# Patient Record
Sex: FEMALE | Race: WHITE | Employment: PART TIME | URBAN - METROPOLITAN AREA
[De-identification: names, ages, dates, MRNs, and addresses within clinical notes are randomized per-mention and may not be internally consistent; named-entity substitution may affect disease eponyms.]

---

## 2017-02-22 ENCOUNTER — HOSPITAL ENCOUNTER (OUTPATIENT)
Dept: MAMMOGRAPHY | Age: 62
Discharge: HOME | End: 2017-02-22
Attending: OBSTETRICS & GYNECOLOGY | Admitting: OBSTETRICS & GYNECOLOGY

## 2017-02-22 DIAGNOSIS — Z12.31 ENCOUNTER FOR SCREENING MAMMOGRAM FOR MALIGNANT NEOPLASM OF BREAST: ICD-10-CM

## 2017-03-06 ENCOUNTER — HOSPITAL ENCOUNTER (OUTPATIENT)
Dept: MAMMOGRAPHY | Age: 62
Discharge: HOME | End: 2017-03-06
Admitting: INTERNAL MEDICINE

## 2017-03-06 DIAGNOSIS — R92.8 ABNORMAL MAMMOGRAM, UNSPECIFIED: ICD-10-CM

## 2017-07-31 ENCOUNTER — OFFICE VISIT (OUTPATIENT)
Dept: ORTHOPEDIC SURGERY | Age: 62
End: 2017-07-31

## 2017-07-31 VITALS — WEIGHT: 140 LBS | HEIGHT: 66 IN | BODY MASS INDEX: 22.5 KG/M2

## 2017-07-31 DIAGNOSIS — S70.02XA CONTUSION OF LEFT HIP, INITIAL ENCOUNTER: ICD-10-CM

## 2017-07-31 DIAGNOSIS — M25.562 LEFT KNEE PAIN, UNSPECIFIED CHRONICITY: Primary | ICD-10-CM

## 2017-07-31 DIAGNOSIS — S83.242A TEAR OF MEDIAL MENISCUS OF LEFT KNEE, CURRENT, UNSPECIFIED TEAR TYPE, INITIAL ENCOUNTER: ICD-10-CM

## 2017-07-31 PROCEDURE — 99203 OFFICE O/P NEW LOW 30 MIN: CPT | Performed by: ORTHOPAEDIC SURGERY

## 2017-07-31 PROCEDURE — 73564 X-RAY EXAM KNEE 4 OR MORE: CPT | Performed by: ORTHOPAEDIC SURGERY

## 2017-07-31 RX ORDER — ATORVASTATIN CALCIUM 10 MG/1
10 TABLET, FILM COATED ORAL DAILY
COMMUNITY

## 2017-07-31 RX ORDER — MELOXICAM 15 MG/1
15 TABLET ORAL DAILY
Qty: 90 TABLET | Refills: 3 | Status: SHIPPED | OUTPATIENT
Start: 2017-07-31 | End: 2018-11-26 | Stop reason: SDUPTHER

## 2017-08-07 ENCOUNTER — OFFICE VISIT (OUTPATIENT)
Dept: ORTHOPEDIC SURGERY | Age: 62
End: 2017-08-07

## 2017-08-07 VITALS — BODY MASS INDEX: 22.5 KG/M2 | HEIGHT: 66 IN | WEIGHT: 139.99 LBS

## 2017-08-07 DIAGNOSIS — M70.62 TROCHANTERIC BURSITIS, LEFT HIP: ICD-10-CM

## 2017-08-07 DIAGNOSIS — S83.242D TEAR OF MEDIAL MENISCUS OF LEFT KNEE, CURRENT, UNSPECIFIED TEAR TYPE, SUBSEQUENT ENCOUNTER: Chronic | ICD-10-CM

## 2017-08-07 DIAGNOSIS — S70.02XD CONTUSION OF LEFT HIP, SUBSEQUENT ENCOUNTER: Primary | Chronic | ICD-10-CM

## 2017-08-07 PROCEDURE — L1812 KO ELASTIC W/JOINTS PRE OTS: HCPCS | Performed by: ORTHOPAEDIC SURGERY

## 2017-08-07 PROCEDURE — 20610 DRAIN/INJ JOINT/BURSA W/O US: CPT | Performed by: ORTHOPAEDIC SURGERY

## 2017-08-07 PROCEDURE — 99999 PR OFFICE/OUTPT VISIT,PROCEDURE ONLY: CPT | Performed by: ORTHOPAEDIC SURGERY

## 2017-08-08 ENCOUNTER — HOSPITAL ENCOUNTER (OUTPATIENT)
Dept: PHYSICAL THERAPY | Age: 62
Discharge: HOME | End: 2017-07-31
Admitting: ORTHOPAEDIC SURGERY

## 2017-09-11 ENCOUNTER — OFFICE VISIT (OUTPATIENT)
Dept: ORTHOPEDIC SURGERY | Age: 62
End: 2017-09-11

## 2017-09-11 VITALS — BODY MASS INDEX: 22.5 KG/M2 | HEIGHT: 66 IN | WEIGHT: 139.99 LBS

## 2017-09-11 DIAGNOSIS — S83.242D TEAR OF MEDIAL MENISCUS OF LEFT KNEE, CURRENT, UNSPECIFIED TEAR TYPE, SUBSEQUENT ENCOUNTER: Chronic | ICD-10-CM

## 2017-09-11 DIAGNOSIS — M70.62 TROCHANTERIC BURSITIS, LEFT HIP: Primary | Chronic | ICD-10-CM

## 2017-09-11 PROCEDURE — 99212 OFFICE O/P EST SF 10 MIN: CPT | Performed by: ORTHOPAEDIC SURGERY

## 2017-09-11 PROCEDURE — 73502 X-RAY EXAM HIP UNI 2-3 VIEWS: CPT | Performed by: ORTHOPAEDIC SURGERY

## 2017-09-20 ENCOUNTER — HOSPITAL ENCOUNTER (OUTPATIENT)
Dept: PHYSICAL THERAPY | Age: 62
Discharge: HOME | End: 2017-09-30
Admitting: ORTHOPAEDIC SURGERY

## 2017-09-20 NOTE — FLOWSHEET NOTE
[facility],  [address] [place]  Phone: [phone]  Fax [phone]    Physical Therapy Daily Treatment Note  Date:  2017    Patient Name:  Naveed Raymond    :  1955  MRN: [id number]  Restrictions/Precautions:    Physician Information:  Referring Practitioner: Dr. Henry Caballero  Medical/Treatment Diagnosis Information:  Diagnosis: Left hip trochanteric bursitis   M70.62l  Left hip pain  M25.552  Left knee pain   M25.562  · Treatment Diagnosis:  Left hip pain M25.552  [] Conservative / [] Surgical - DOS:  Therapy Diagnosis/Practice Pattern:  Practice Pattern E: Localized Inflammation  Insurance/Certification information:  PT Insurance Information: 17 ANTHEM - $0CP - $2500DED(MET) - 60PT/OT- 100% - NO AUTH  Plan of care signed: [] YES  [] NO  Number of Comorbidities:  [x]0     []1-2    []3+  Date of Patient follow up with Physician:     G-Code (if applicable):      Date G-Code Applied:  17  PT G-Codes  Functional Assessment Tool Used: LEFS  Score: 16%  Functional Limitation: Mobility: Walking and moving around  Mobility: Walking and Moving Around Current Status ():  At least 1 percent but less than 20 percent impaired, limited or restricted  Mobility: Walking and Moving Around Goal Status (): 0 percent impaired, limited or restricted    Progress Note: [x]  Yes  []  No  Next due by: Visit #10        Latex Allergy:  [x]NO      []YES  Preferred Language for Healthcare:   [x]English       []other:    Visit # Insurance Allowable Reporting Period    Begin Date: 2017               End Date:      RECERT DUE BY:     Pain level:  2/10     SUBJECTIVE:  See eval    OBJECTIVE: See eval  Observation:  Palpation:     Test used Initial score Current Score   Pain Summary VAS 2    Functional questionnaire LEFS 16    ROM flexion      extension     Strength quad      ABD      flexion motor skill, proprioception of core, proximal hip and LE for self care, mobility, lifting, and ambulation/stair navigation      Manual Treatments:  PROM / STM / Oscillations-Mobs:  G-I, II, III, IV (PA's, Inf., Post.)  [] (70860) Provided manual therapy to mobilize LE, proximal hip and/or LS spine soft tissue/joints for the purpose of modulating pain, promoting relaxation,  increasing ROM, reducing/eliminating soft tissue swelling/inflammation/restriction, improving soft tissue extensibility and allowing for proper ROM for normal function with self care, mobility, lifting and ambulation. Modalities:      Charges:  Timed Code Treatment Minutes: 30   Total Treatment Minutes: 60     [x] EVAL (LOW) 99410 (typically 20 minutes face-to-face)  [] EVAL (MOD) 18141 (typically 30 minutes face-to-face)  [] EVAL (HIGH) 54436 (typically 45 minutes face-to-face)  [] RE-EVAL     [x] ON(51213) x  2   [] IONTO  [] NMR (46767) x      [] VASO  [] Manual (97465) x       [] Other:  [] TA x       [] Mech Traction (27728)  [] ES(attended) (33296)      [] ES (un) (65407):     GOALS:   Patient stated goal: exercise without pain. Therapist goals for Patient:   Short Term Goals: To be achieved in: 2 weeks  1. Independent in HEP and progression per patient tolerance, in order to prevent re-injury. 2. Patient will have a decrease in pain to facilitate improvement in movement, function, and ADLs as indicated by Functional Deficits. Long Term Goals: To be achieved in: 6 weeks  1. Disability index score of 0% or less for the LEFS to assist with reaching prior level of function. 2. Patient will demonstrate increased AROM of left hip and knee to equal that of right to allow for proper joint functioning as indicated by patients Functional Deficits.    3. Patient will demonstrate an increase in Strength to good proximal hip strength and control, within 5lb HHD in LE to allow for proper functional mobility as indicated by patients
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
## 2017-09-29 ENCOUNTER — HOSPITAL ENCOUNTER (OUTPATIENT)
Dept: PHYSICAL THERAPY | Age: 62
Discharge: HOME | End: 2017-09-29
Admitting: ORTHOPAEDIC SURGERY

## 2017-09-29 NOTE — FLOWSHEET NOTE
[facility], [address]  Phone: [phone]  Fax [phone]    Physical Therapy Daily Treatment Note  Date:  2017    Patient Name:  Arabella Gillette    :  1955  MRN: [id number]  Restrictions/Precautions:    Physician Information:  Referring Practitioner: Dr. Erik Clark  Medical/Treatment Diagnosis Information:  Diagnosis: Left hip trochanteric bursitis   M70.62l  Left hip pain  M25.552  Left knee pain   M25.562  · Treatment Diagnosis:  Left hip pain M25.552  [] Conservative / [] Surgical - DOS:  Therapy Diagnosis/Practice Pattern:  Practice Pattern E: Localized Inflammation  Insurance/Certification information:  PT Insurance Information: 17 ANTHEM - $0CP - $2500DED(MET) - 60PT/OT- 100% - NO AUTH  Plan of care signed: [] YES  [] NO  Number of Comorbidities:  [x]0     []1-2    []3+  Date of Patient follow up with Physician:     G-Code (if applicable):      Date G-Code Applied:  17  PT G-Codes  Functional Assessment Tool Used: LEFS  Score: 16%  Functional Limitation: Mobility: Walking and moving around  Mobility: Walking and Moving Around Current Status (): At least 1 percent but less than 20 percent impaired, limited or restricted  Mobility: Walking and Moving Around Goal Status (): 0 percent impaired, limited or restricted    Progress Note: [x]  Yes  []  No  Next due by: Visit #10        Latex Allergy:  [x]NO      []YES  Preferred Language for Healthcare:   [x]English       []other:    Visit # Insurance Allowable Reporting Period   2 60 Begin Date: 2017               End Date:      RECERT DUE BY:     Pain level:  2/10     SUBJECTIVE: Pt is not getting sharp pains in the hip. Ice is really helping.    OBJECTIVE:   Observation:  Palpation:     Test used Initial score Current Score   Pain Summary VAS 2    Functional questionnaire LEFS 16    ROM flexion      extension Strength quad      ABD      flexion          RESTRICTIONS/PRECAUTIONS:     Exercises/Interventions:     Therapeutic Ex Sets/reps Notes        Prone TKE X 10  :05 hep   Clamshells  orange X 20 bilat hep   Bridges  SB   3D  X 10 each hep   Supine hip abd/ add with legs ext X 10 hep   SAQ X 10 hep   Supine march x15 hep   Piriformis  3x :20 hep   Seated HS s 3x  :20 hep   Seated towel s 3x  ;20 hep   Incline s 4x  :20    TB lateral side step Orange 2 laps. Sleep position,  ice reviewed     SLR with ER 2 x 10     Leg Press bilat with add  80#  2 x 10     Leg Press ECC 70#    [facility]  Hip abd 45#  2 x 10    Manual Intervention     Patella mobs 3 min    PROM knee 3 min. NMR re-education                                                      Therapeutic Exercise and NMR EXR  [x] (24132) Provided verbal/tactile cueing for activities related to strengthening, flexibility, endurance, ROM for improvements in LE, proximal hip, and core control with self care, mobility, lifting, ambulation.  [] (42947) Provided verbal/tactile cueing for activities related to improving balance, coordination, kinesthetic sense, posture, motor skill, proprioception  to assist with LE, proximal hip, and core control in self care, mobility, lifting, ambulation and eccentric single leg control.      NMR and Therapeutic Activities:    [x] (52169 or 69358) Provided verbal/tactile cueing for activities related to improving balance, coordination, kinesthetic sense, posture, motor skill, proprioception and motor activation to allow for proper function of core, proximal hip and LE with self care and ADLs  [] (54953) Gait Re-education- Provided training and instruction to the patient for proper LE, core and proximal hip recruitment and positioning and eccentric body weight control with ambulation re-education including up and down stairs     Home Exercise Program:    [x] (07381) Reviewed/Progressed HEP activities related to right to allow for proper joint functioning as indicated by patients Functional Deficits. 3. Patient will demonstrate an increase in Strength to good proximal hip strength and control, within 5lb HHD in LE to allow for proper functional mobility as indicated by patients Functional Deficits. 4. Patient will return to sleeping 7- 8 hours at night on either side without increased symptoms or restriction. 5. Able to ascend and descend stairs and incline/declines without hip pain. New or Updated Goals (if applicable):  [x] No change to goals established upon initial eval/last progress note:  New Goals:    Progression Towards Functional goals:   [] Patient is progressing as expected towards functional goals listed. [] Progression is slowed due to complexities listed. [] Progression has been slowed due to co-morbidities.   [x] Plan just implemented, too soon to assess goals progression  [] Other:     ASSESSMENT:    [] Improvement noted relative to goals:  [] No Improvement noted related to goals:  Summary/Patient's response to treatment: See Eval    Treatment/Activity Tolerance:  [x] Patient tolerated treatment well [] Patient limited by fatique  [] Patient limited by pain  [] Patient limited by other medical complications  [] Other:     Prognosis: [] Good [] Fair  [] Poor    Patient Requires Follow-up: [x] Yes  [] No    PLAN: See eval  [x] Continue per plan of care [] Alter current plan (see comments)  [] Plan of care initiated [] Hold pending MD visit [] Discharge    Electronically signed by: Daniela Vigil PT

## 2017-10-04 ENCOUNTER — HOSPITAL ENCOUNTER (OUTPATIENT)
Dept: PHYSICAL THERAPY | Age: 62
Discharge: HOME | End: 2017-10-04
Admitting: ORTHOPAEDIC SURGERY

## 2017-10-04 NOTE — FLOWSHEET NOTE
[facility], [address] [place] [address]  Phone: [phone]  Fax [phone]    Physical Therapy Daily Treatment Note  Date:  10/4/2017    Patient Name:  Dru Epley    :  1955  MRN: [id number]  Restrictions/Precautions:    Physician Information:  Referring Practitioner: Dr. Steve Rausch  Medical/Treatment Diagnosis Information:  Diagnosis: Left hip trochanteric bursitis   M70.62l  Left hip pain  M25.552  Left knee pain   M25.562  · Treatment Diagnosis:  Left hip pain M25.552  [] Conservative / [] Surgical - DOS:  Therapy Diagnosis/Practice Pattern:  Practice Pattern E: Localized Inflammation  Insurance/Certification information:  PT Insurance Information: 17 ANTHEM - $0CP - $2500DED(MET) - 60PT/OT- 100% - NO AUTH  Plan of care signed: [] YES  [] NO  Number of Comorbidities:  [x]0     []1-2    []3+  Date of Patient follow up with Physician:     G-Code (if applicable):      Date G-Code Applied:  17  PT G-Codes  Functional Assessment Tool Used: LEFS  Score: 16%  Functional Limitation: Mobility: Walking and moving around  Mobility: Walking and Moving Around Current Status (): At least 1 percent but less than 20 percent impaired, limited or restricted  Mobility: Walking and Moving Around Goal Status (): 0 percent impaired, limited or restricted    Progress Note: [x]  Yes  []  No  Next due by: Visit #10        Latex Allergy:  [x]NO      []YES  Preferred Language for Healthcare:   [x]English       []other:    Visit # Insurance Allowable Reporting Period   3 60 Begin Date: 10/4/2017               End Date:      RECERT DUE BY:     Pain level:  2/10     SUBJECTIVE: Pt has discomfort when palpating medial knee. Pt had tenderness over fibular head. Pt went for a 40 minute walk. Pt is not taking any meds currently.     OBJECTIVE:   Observation:  Palpation:     Test used Initial score Current Score

## 2017-10-04 NOTE — FLOWSHEET NOTE
[facility] [address] [facility] [place]  Phone: [phone]  Fax [phone]      ATHLETIC TRAINING [address]  Date:  10/4/2017    Patient Name:  Sandeep Pulliam    :  1955  MRN: [id number]  Restrictions/Precautions:    Medical/Treatment Diagnosis Information:  · Diagnosis: Left hip trochanteric bursitis    Left hip pain  Left knee pain     · Treatment Diagnosis:  Left hip pain   ·   Physician Information:  Dr. Joan Baker Post-op  8 wks  12 wks 16 wks 20 wks   24 wks                            Activity Log                                                  DOS/DOI:                                                    Date: 10/4/17    ATC communication    Bike    Elliptical    Treadmill    Airdyne        Gastroc stretch    Soleus stretch    Hamstring stretch    ITB stretch    Hip Flexor stretch    Quad stretch    Adductor stretch     CC walk out Retro and forward 30# 5x ea    Weight Shifting sp                              fp                              tp    Lateral walking (with/w/o TB)        Balance: PEP/Yessica board                   SLS          Star excursion load/explode          Extremity reach UE/LE        Leg Press Dany. 80# 2x10                      Ecc. 70# 2x10                      Inv. Calf Press Dany. Ecc.                        Inv.        MOON   Flex               ABd 45# R/L 2x10               ADd              TKE 45# 2x10               Ext        Steps Up               Up and Over               Down               Lateral               Rotation        Squats  mini                  wall                 [facility]         Lunges:  Lunge to Balance                   Balance to Lunge                   Walking        Knee Extension Bilat. Ecc.                               Inv. Hamstring Curls Bilat. Ecc.                               Inv.        Soleus Press Bilat. Ecc.                           Inv.                             Ladders                Square               Jump/Hop  Low                      Med.                      High                                                            Modality Declined    Initials                             SA

## 2017-10-06 ENCOUNTER — TELEPHONE (OUTPATIENT)
Dept: ORTHOPEDIC SURGERY | Age: 62
End: 2017-10-06

## 2017-10-11 ENCOUNTER — HOSPITAL ENCOUNTER (OUTPATIENT)
Dept: PHYSICAL THERAPY | Age: 62
Discharge: HOME | End: 2017-10-11
Admitting: ORTHOPAEDIC SURGERY

## 2017-10-11 NOTE — FLOWSHEET NOTE
[facility], [address]  Phone: [phone]  Fax [phone]    Physical Therapy Daily Treatment Note  Date:  10/11/2017    Patient Name:  Teresita Rolon    :  1955  MRN: [id number]  Restrictions/Precautions:    Physician Information:  Referring Practitioner: Dr. Alejandra Jarquin  Medical/Treatment Diagnosis Information:  Diagnosis: Left hip trochanteric bursitis   M70.62l  Left hip pain  M25.552  Left knee pain   M25.562  · Treatment Diagnosis:  Left hip pain M25.552  [] Conservative / [] Surgical - DOS:  Therapy Diagnosis/Practice Pattern:  Practice Pattern E: Localized Inflammation  Insurance/Certification information:  PT Insurance Information: 17 ANTHEM - $0CP - $2500DED(MET) - 60PT/OT- 100% - NO AUTH  Plan of care signed: [] YES  [] NO  Number of Comorbidities:  [x]0     []1-2    []3+  Date of Patient follow up with Physician:     G-Code (if applicable):      Date G-Code Applied:  17  PT G-Codes  Functional Assessment Tool Used: LEFS  Score: 16%  Functional Limitation: Mobility: Walking and moving around  Mobility: Walking and Moving Around Current Status (): At least 1 percent but less than 20 percent impaired, limited or restricted  Mobility: Walking and Moving Around Goal Status (): 0 percent impaired, limited or restricted    Progress Note: [x]  Yes  []  No  Next due by: Visit #10        Latex Allergy:  [x]NO      []YES  Preferred Language for Healthcare:   [x]English       []other:    Visit # Insurance Allowable Reporting Period   4  Begin Date: 10/11/2017               End Date:      RECERT DUE BY:     Pain level:  2/10     SUBJECTIVE: Pt was sore after the visit for two days. Pt had pain in both her hip and knee. Today, she feels good. Hip does wake her from sleep. We did add weights last visit. Pt has a lot of pain getting in and out of car in her knee.    Sees  at the end of October. Pt did not do any walking this weekend. OBJECTIVE:   Observation:  Palpation:     Test used Initial score Current Score   Pain Summary VAS 2    Functional questionnaire LEFS 16    ROM flexion      extension     Strength quad      ABD      flexion          RESTRICTIONS/PRECAUTIONS:     Exercises/Interventions:       See ATc. Therapeutic Ex Sets/reps Notes   Supine Hip flex s 3x  :20     Prone TKE X 10  :05 hep   Clamshells  orange X 20 bilat hep   Bridges  SB   3D  X 10 each hep   Supine hip abd/ add with legs ext X 10 hep   SAQ X 10 hep   3D gastroc s  3x  ;20     Piriformis  3x :20 hep   Seated HS s 3x  :20 hep   Seated towel s 3x  ;20 hep   Incline s 4x  :20    TB lateral side step Orange 2 laps. Sleep position,  ice reviewed     SLR with ER 2 x 10              Manual Intervention     Patella mobs 3 min    PROM knee 3 min. Long axis distraction of left leg 3 min    Mulligan lateral mobs 3 min    Mulligan inf mobs 3 mn. NMR re-education                                                      Therapeutic Exercise and NMR EXR  [x] (12140) Provided verbal/tactile cueing for activities related to strengthening, flexibility, endurance, ROM for improvements in LE, proximal hip, and core control with self care, mobility, lifting, ambulation.  [] (82476) Provided verbal/tactile cueing for activities related to improving balance, coordination, kinesthetic sense, posture, motor skill, proprioception  to assist with LE, proximal hip, and core control in self care, mobility, lifting, ambulation and eccentric single leg control.      NMR and Therapeutic Activities:    [x] (44909 or 12846) Provided verbal/tactile cueing for activities related to improving balance, coordination, kinesthetic sense, posture, motor skill, proprioception and motor activation to allow for proper function of core, proximal hip and LE with self care and ADLs  [] (69191) Gait Re-education- Provided

## 2017-10-11 NOTE — FLOWSHEET NOTE
[facility],  [address] [place]  Phone: [phone]  Fax [phone]      ATHLETIC TRAINING [address]  Date:  10/11/2017    Patient Name:  Jes Hoyt    :  1955  MRN: [id number]  Restrictions/Precautions:    Medical/Treatment Diagnosis Information:  · Diagnosis: Left hip trochanteric bursitis    Left hip pain  Left knee pain     · Treatment Diagnosis:  Left hip pain   ·   Physician Information:  Dr. Charles Hendrix Post-op  8 wks  12 wks 16 wks 20 wks   24 wks                            Activity Log                                                  DOS/DOI:                                                    Date: 10/4/17  10/11/17    ATC communication     Bike     Elliptical     Treadmill     Airdyne          Gastroc stretch     Soleus stretch     Hamstring stretch     ITB stretch     Hip Flexor stretch     Quad stretch     Adductor stretch      CC walk out Retro and forward 30# 5x ea     Weight Shifting sp                               fp                               tp     Lateral walking (with/w/o TB)          Balance: PEP/Yessica board                    SLS           Star excursion load/explode           Extremity reach UE/LE          Leg Press Dany. 80# 2x10                       Ecc. 70# 2x10                       Inv. Calf Press Dany. Ecc.                         Inv.          MOON   Flex                ABd 45# R/L 2x10                ADd               TKE 45# 2x10                Ext          Steps Up                Up and Over                Down                Lateral                Rotation          Squats  mini                   wall                  BOSU           Lunges:  Lunge to Balance                    Balance to Lunge                    Walking          Knee Extension Bilat.                                                 Ecc. Inv.          Hamstring Curls Bilat. Ecc.                                Inv.          Soleus Press Bilat. Ecc.                            Inv.                                Ladders                 Square                Jump/Hop  Low                       Med.                       High                                Reformer FW Parallel Heels  2R 20x   Reformer FW Parallel Toes  2R 20x    Reformer FW V Heels  2R 20x    Reformer Leg Straps Parallel Feet  1R1B 15x    Reformer Jump Board V Feet  1R 1B 15x      Reformer Leg Straps Circles                          1R 1R 15x    Modality Declined  Declined    Initials                             SA  SA

## 2017-10-12 NOTE — FLOWSHEET NOTE
[facility], [address] [place]  Phone: [phone]  Fax [phone]    Physical Therapy Daily Treatment Note  Date:  10/12/2017    Patient Name:  Teresita Rolon    :  1955  MRN: [id number]  Restrictions/Precautions:    Physician Information:  Referring Practitioner: Dr. Alejandra Jarquin  Medical/Treatment Diagnosis Information:  Diagnosis: Left hip trochanteric bursitis   M70.62l  Left hip pain  M25.552  Left knee pain   M25.562  · Treatment Diagnosis:  Left hip pain M25.552  [] Conservative / [] Surgical - DOS:  Therapy Diagnosis/Practice Pattern:  Practice Pattern E: Localized Inflammation  Insurance/Certification information:  PT Insurance Information: 17 ANTHEM - $0CP - $2500DED(MET) - 60PT/OT- 100% - NO AUTH  Plan of care signed: [] YES  [] NO  Number of Comorbidities:  [x]0     []1-2    []3+  Date of Patient follow up with Physician:     G-Code (if applicable):      Date G-Code Applied:  17  PT G-Codes  Functional Assessment Tool Used: LEFS  Score: 16%  Functional Limitation: Mobility: Walking and moving around  Mobility: Walking and Moving Around Current Status (): At least 1 percent but less than 20 percent impaired, limited or restricted  Mobility: Walking and Moving Around Goal Status (): 0 percent impaired, limited or restricted    Progress Note: [x]  Yes  []  No  Next due by: Visit #10        Latex Allergy:  [x]NO      []YES  Preferred Language for Healthcare:   [x]English       []other:    Visit # Insurance Allowable Reporting Period    Begin Date: 10/12/2017               End Date:      RECERT DUE BY:     Pain level:  2/10     SUBJECTIVE: Pt was sore after the visit for two days. Pt had pain in both her hip and knee. Today, she feels good. Hip does wake her from sleep. We did add weights last visit. Pt has a lot of pain getting in and out of car in her knee.    Sees  at the end of October. Pt did not do any walking this weekend. Pt did report that she did wall paper on thurs. She was squatting to the floor. On Sunday, she groomed her two small dogs. She did a lot of bending and squatting as well. OBJECTIVE:   Observation:  Palpation:     Test used Initial score Current Score   Pain Summary VAS 2    Functional questionnaire LEFS 16    ROM flexion      extension     Strength quad      ABD      flexion          RESTRICTIONS/PRECAUTIONS:     Exercises/Interventions:       See ATc. Therapeutic Ex Sets/reps Notes   Supine Hip flex s 3x  :20     Prone TKE X 10  :05 hep   Clamshells  orange X 20 bilat hep   Bridges  SB   3D  X 10 each hep   Supine hip abd/ add with legs ext X 10 hep   SAQ X 10 hep   3D gastroc s  3x  ;20     Piriformis  3x :20 hep   Seated HS s 3x  :20 hep   Seated towel s 3x  ;20 hep   Incline s 4x  :20    TB lateral side step Orange 2 laps. Sleep position,  ice reviewed     SLR with ER 2 x 10              Manual Intervention     Patella mobs 3 min    PROM knee 3 min. Long axis distraction of left leg 3 min    Mulligan lateral mobs 3 min    Mulligan inf mobs 3 mn. NMR re-education                                                      Therapeutic Exercise and NMR EXR  [x] (79256) Provided verbal/tactile cueing for activities related to strengthening, flexibility, endurance, ROM for improvements in LE, proximal hip, and core control with self care, mobility, lifting, ambulation.  [] (41051) Provided verbal/tactile cueing for activities related to improving balance, coordination, kinesthetic sense, posture, motor skill, proprioception  to assist with LE, proximal hip, and core control in self care, mobility, lifting, ambulation and eccentric single leg control.      NMR and Therapeutic Activities:    [x] (31465 or 65687) Provided verbal/tactile cueing for activities related to improving balance, coordination, kinesthetic sense, weeks  1. Independent in HEP and progression per patient tolerance, in order to prevent re-injury. 2. Patient will have a decrease in pain to facilitate improvement in movement, function, and ADLs as indicated by Functional Deficits. Long Term Goals: To be achieved in: 6 weeks  1. Disability index score of 0% or less for the LEFS to assist with reaching prior level of function. 2. Patient will demonstrate increased AROM of left hip and knee to equal that of right to allow for proper joint functioning as indicated by patients Functional Deficits. 3. Patient will demonstrate an increase in Strength to good proximal hip strength and control, within 5lb HHD in LE to allow for proper functional mobility as indicated by patients Functional Deficits. 4. Patient will return to sleeping 7- 8 hours at night on either side without increased symptoms or restriction. 5. Able to ascend and descend stairs and incline/declines without hip pain. New or Updated Goals (if applicable):  [x] No change to goals established upon initial eval/last progress note:  New Goals:    Progression Towards Functional goals:   [] Patient is progressing as expected towards functional goals listed. [] Progression is slowed due to complexities listed. [] Progression has been slowed due to co-morbidities.   [x] Plan just implemented, too soon to assess goals progression  [] Other:     ASSESSMENT:    [] Improvement noted relative to goals:  [] No Improvement noted related to goals:  Summary/Patient's response to treatment: See Eval    Treatment/Activity Tolerance:  [x] Patient tolerated treatment well [] Patient limited by fatique  [] Patient limited by pain  [] Patient limited by other medical complications  [] Other:     Prognosis: [] Good [] Fair  [] Poor    Patient Requires Follow-up: [x] Yes  [] No    PLAN: See eval  [x] Continue per plan of care [] Alter current plan (see comments)  [] Plan of care initiated [] Hold pending MD visit [] Discharge    Electronically signed by: Katherine Lomas, PT

## 2017-11-01 ENCOUNTER — HOSPITAL ENCOUNTER (OUTPATIENT)
Dept: PHYSICAL THERAPY | Age: 62
Discharge: HOME | End: 2017-11-30
Attending: ORTHOPAEDIC SURGERY | Admitting: ORTHOPAEDIC SURGERY

## 2017-11-06 ENCOUNTER — OFFICE VISIT (OUTPATIENT)
Dept: ORTHOPEDIC SURGERY | Age: 62
End: 2017-11-06

## 2017-11-06 DIAGNOSIS — M70.62 TROCHANTERIC BURSITIS, LEFT HIP: Chronic | ICD-10-CM

## 2017-11-06 DIAGNOSIS — M54.42 ACUTE LEFT-SIDED LOW BACK PAIN WITH LEFT-SIDED SCIATICA: ICD-10-CM

## 2017-11-06 DIAGNOSIS — S70.02XD CONTUSION OF LEFT HIP, SUBSEQUENT ENCOUNTER: Chronic | ICD-10-CM

## 2017-11-06 DIAGNOSIS — S83.412D SPRAIN OF MEDIAL COLLATERAL LIGAMENT OF LEFT KNEE, SUBSEQUENT ENCOUNTER: ICD-10-CM

## 2017-11-06 DIAGNOSIS — M54.50 LEFT-SIDED LOW BACK PAIN WITHOUT SCIATICA, UNSPECIFIED CHRONICITY: Primary | ICD-10-CM

## 2017-11-06 PROBLEM — S83.412A SPRAIN OF MEDIAL COLLATERAL LIGAMENT OF LEFT KNEE: Status: ACTIVE | Noted: 2017-11-06

## 2017-11-06 PROBLEM — S83.412A SPRAIN OF MEDIAL COLLATERAL LIGAMENT OF LEFT KNEE: Chronic | Status: ACTIVE | Noted: 2017-11-06

## 2017-11-06 PROCEDURE — 99213 OFFICE O/P EST LOW 20 MIN: CPT | Performed by: ORTHOPAEDIC SURGERY

## 2017-11-06 RX ORDER — METHYLPREDNISOLONE 4 MG/1
TABLET ORAL
Qty: 1 KIT | Refills: 0 | Status: SHIPPED | OUTPATIENT
Start: 2017-11-06

## 2017-11-06 NOTE — PROGRESS NOTES
Subjective:      Patient ID: Dru Epley is a 58 y.o. female.     Knee Pain      Hip Pain          Review of Systems    Objective:   Physical Exam    Assessment:      ***      Plan:      ***

## 2017-11-06 NOTE — PROGRESS NOTES
She comes back today with diffuse areas of soreness in her left side. She is now developed soreness in her left buttock and low back region which is now transferring down the lateral side of the leg down her thigh to about the lower part of her calf. She does complain of the lateral side of her left hip but also complains of her left knee medially. There is no locking of the left knee. There is no effusion or swelling of the left knee. Her pain in the back and down the leg is now starting to bother her a little bit at nighttime. Review of systems from July 31, 2017 is reviewed with the patient and only changed by the recent onset of the radiating pain down the left leg. Examination of the left hip reveals full symmetric range of motion. Negative logroll sign. She does have lateral tenderness over the trochanteric region. Examination of the left knee reveals no effusion. It is overall very stable. She has no joint line tenderness medial or lateral but does have moderate tenderness over the medial femoral condyle or the origin of the medial collateral ligament. She does not significantly opened up with valgus stress but does have some mild pain with that. She also has some mild medial peripatellar retropatellar tenderness. Zev sign is negative. At this point we did examine her low back which revealed tenderness in the lower lumbar region. She also had tenderness left greater than right in the paralumbar areas as well as in the sciatic notch. She was stiff tilting to the left more so than to the right. Straight leg raising was negative. She had a weakened great toe extensor on the left compared to the right. No sensory deficits noted. At this point have recommended putting her on a six-day Medrol Dosepak.   We would plan on obtaining an MRI scan of the low back to see whether or not there is any substantial pathology that would warrant attention for this left-sided sciatica presentation. We held off on an injection for the left bursa in the trochanter because were putting her on Medrol. We talked about trying to protect the knee and she states she did wear the brace because it felt a little uncomfortable. She's been pushing herself harder to walk through pain which is not been helping any of the situation. She did state that she would consider trying to do more recumbent bike in the near future. We'll see her back in a week after the MRI scan of the low back is done.

## 2017-11-07 ENCOUNTER — TELEPHONE (OUTPATIENT)
Dept: ORTHOPEDIC SURGERY | Age: 62
End: 2017-11-07

## 2017-11-20 ENCOUNTER — OFFICE VISIT (OUTPATIENT)
Dept: ORTHOPEDIC SURGERY | Age: 62
End: 2017-11-20

## 2017-11-20 DIAGNOSIS — M54.42 ACUTE LEFT-SIDED LOW BACK PAIN WITH LEFT-SIDED SCIATICA: Primary | Chronic | ICD-10-CM

## 2017-11-20 DIAGNOSIS — M70.62 TROCHANTERIC BURSITIS, LEFT HIP: Chronic | ICD-10-CM

## 2017-11-20 PROCEDURE — 99999 PR OFFICE/OUTPT VISIT,PROCEDURE ONLY: CPT | Performed by: ORTHOPAEDIC SURGERY

## 2017-11-20 PROCEDURE — 20610 DRAIN/INJ JOINT/BURSA W/O US: CPT | Performed by: ORTHOPAEDIC SURGERY

## 2017-11-20 NOTE — PROGRESS NOTES
Site: left hip     Betamethazone  Lot number: [id number]  NDC#  [id number](EDILSON)    Marcaine  NDC# [id number]  Lot number: [id number]

## 2017-11-29 ENCOUNTER — OFFICE VISIT (OUTPATIENT)
Dept: ORTHOPEDIC SURGERY | Age: 62
End: 2017-11-29

## 2017-11-29 VITALS — WEIGHT: 139.99 LBS | BODY MASS INDEX: 22.5 KG/M2 | HEIGHT: 66 IN

## 2017-11-29 DIAGNOSIS — M70.62 GREATER TROCHANTERIC BURSITIS, LEFT: Primary | ICD-10-CM

## 2017-11-29 PROCEDURE — 99243 OFF/OP CNSLTJ NEW/EST LOW 30: CPT | Performed by: PHYSICAL MEDICINE & REHABILITATION
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
## 2018-06-08 ENCOUNTER — HOSPITAL ENCOUNTER (OUTPATIENT)
Dept: MAMMOGRAPHY | Age: 63
Discharge: HOME | End: 2018-06-08

## 2018-06-08 DIAGNOSIS — N95.1 SYMPTOMATIC MENOPAUSAL OR FEMALE CLIMACTERIC STATES: ICD-10-CM

## 2018-06-08 DIAGNOSIS — Z12.31 ENCOUNTER FOR SCREENING MAMMOGRAM FOR MALIGNANT NEOPLASM OF BREAST: ICD-10-CM

## 2018-11-26 DIAGNOSIS — M25.562 LEFT KNEE PAIN, UNSPECIFIED CHRONICITY: ICD-10-CM

## 2018-11-26 RX ORDER — MELOXICAM 15 MG/1
15 TABLET ORAL DAILY
Qty: 90 TABLET | Refills: 3 | Status: SHIPPED | OUTPATIENT
Start: 2018-11-26

## 2018-11-29 DIAGNOSIS — S83.412A SPRAIN OF MEDIAL COLLATERAL LIGAMENT OF LEFT KNEE, INITIAL ENCOUNTER: Chronic | ICD-10-CM

## 2018-11-29 DIAGNOSIS — M70.62 TROCHANTERIC BURSITIS, LEFT HIP: Primary | Chronic | ICD-10-CM

## 2018-11-29 RX ORDER — MELOXICAM 15 MG/1
15 TABLET ORAL DAILY
Qty: 90 TABLET | Refills: 3 | Status: SHIPPED | OUTPATIENT
Start: 2018-11-29

## 2019-06-29 ENCOUNTER — HOSPITAL ENCOUNTER (OUTPATIENT)
Dept: WOMENS IMAGING | Age: 64
Discharge: HOME | End: 2019-06-29
Payer: COMMERCIAL

## 2019-06-29 DIAGNOSIS — Z12.31 ENCOUNTER FOR SCREENING MAMMOGRAM FOR MALIGNANT NEOPLASM OF BREAST: ICD-10-CM

## 2019-06-29 PROCEDURE — 77063 BREAST TOMOSYNTHESIS BI: CPT

## 2020-07-15 ENCOUNTER — HOSPITAL ENCOUNTER (OUTPATIENT)
Dept: WOMENS IMAGING | Age: 65
Discharge: HOME | End: 2020-07-15
Payer: COMMERCIAL

## 2020-07-15 PROCEDURE — 77063 BREAST TOMOSYNTHESIS BI: CPT

## 2020-12-16 ENCOUNTER — OFFICE VISIT (OUTPATIENT)
Dept: ORTHOPEDIC SURGERY | Age: 65
End: 2020-12-16
Payer: COMMERCIAL

## 2020-12-16 VITALS — BODY MASS INDEX: 22.34 KG/M2 | WEIGHT: 139 LBS

## 2020-12-16 PROCEDURE — 99244 OFF/OP CNSLTJ NEW/EST MOD 40: CPT | Performed by: ORTHOPAEDIC SURGERY

## 2020-12-16 RX ORDER — MELOXICAM 15 MG/1
15 TABLET ORAL DAILY
Qty: 30 TABLET | Refills: 1 | Status: SHIPPED | OUTPATIENT
Start: 2020-12-16

## 2020-12-21 ENCOUNTER — HOSPITAL ENCOUNTER (OUTPATIENT)
Dept: PHYSICAL THERAPY | Age: 65
Setting detail: THERAPIES SERIES
Discharge: HOME | End: 2020-12-21
Payer: COMMERCIAL

## 2020-12-21 PROCEDURE — 97110 THERAPEUTIC EXERCISES: CPT

## 2020-12-21 PROCEDURE — 97161 PT EVAL LOW COMPLEX 20 MIN: CPT

## 2020-12-21 NOTE — PLAN OF CARE
[facility], [facility]  [address] [place] [address]  Phone: [phone]  Fax [phone]     Physical Therapy Certification    Dear Referring Practitioner: Prosper Godinez MD,    We had the pleasure of evaluating the following patient for physical therapy services at [address] [place] [address]. A summary of our findings can be found in the initial assessment below. This includes our plan of care. If you have any questions or concerns regarding these findings, please do not hesitate to contact me at the office phone number checked above. Thank you for the referral.       Physician Signature:_______________________________Date:__________________  By signing above (or electronic signature), therapists plan is approved by physician    Patient: Analia Lafleur   : 1955   MRN: [id number]  Referring Physician: Referring Practitioner: Prosper Godinez MD      Evaluation Date: 2020      Medical Diagnosis Information:  Diagnosis: M25.512 L shoulder pain   Treatment Diagnosis: M25.512 Left shoulder pain                                         Insurance information: PT Insurance Information: BCBS    Precautions/ Contra-indications: None  C-SSRS Triggered by Intake questionnaire (Past 2 wk assessment):   [x] No, Questionnaire did not trigger screening.   [] Yes, Patient intake triggered further evaluation      [] C-SSRS Screening completed  [] PCP notified via Plan of Care  [] Emergency services notified     Latex Allergy:  [x]NO      []YES  Preferred Language for Healthcare:   [x]English       []other:    SUBJECTIVE: Patient stated complaint: Patient reports that she began having L arm pain in Sept. When she began golfing more frequently. She has most of her pain with activity including golfing, putting her shirt on/off, and playing guitar.  She received an x-ray which did not reveal anything and was prescribed Meloxicam which has helped some. She has difficulty sleeping. Relevant Medical History: None  Functional Disability Index: QuickDASH: 18; 16% disability    Pain Scale: 7/10  Easing factors: rest, meloxicam  Provocative factors: lifting, golfing,      Type: []Constant   [x]Intermittent  []Radiating []Localized []other:     Numbness/Tingling: some into her L hand. Occupation/School: [employer]    Living Status/Prior Level of Function: Independent with ADLs and IADLs, able to perform ADLs with some pain during/following    OBJECTIVE:     CERV ROM     Cervical Flexion     Cervical Extension     Cervical SB     Cervical rotation          ROM Left Right   Shoulder Flex 150 deg    Shoulder Abd 150 deg pain at end range    Shoulder ER 80 deg at 0 deg abd     Shoulder IR Belly                    Strength  Left Right   Shoulder Flex 4/5 4+/5   Shoulder Scap 3+/5 4+/5   Shoulder ER 3+/5 4+/5   Shoulder IR 4-/5 4+/5               Reflexes/Sensation:    [x]Dermatomes/Myotomes intact    [x]Reflexes equal and normal bilaterally   []Other:    Joint mobility: L GHJ   []Normal    []Hypo   []Hyper    Palpation: ttp of posterior cuff    Functional Mobility/Transfers: WFL    Posture: FH, rounded and IR shoulders    Bandages/Dressings/Incisions: N/A    Gait: (include devices/WB status): WNL     Orthopedic Special Tests: Full can (+) on L, empty can (+) on L, Joaquin Blaine (+) on L                       [x] Patient history, allergies, meds reviewed. Medical chart reviewed. See intake form. Review Of Systems (ROS):  [x]Performed Review of systems (Integumentary, CardioPulmonary, Neurological) by intake and observation. Intake form has been scanned into medical record. Patient has been instructed to contact their primary care physician regarding ROS issues if not already being addressed at this time.       Co-morbidities/Complexities (which will affect course of rehabilitation):   []None           Arthritic conditions   []Rheumatoid to tolerate prolonged functional positions   []Reduced ability or difficulty with changes of positions or transfers between positions   [x]Reduced ability to maintain good posture and demonstrate good body mechanics with sitting, bending, and lifting   [] Reduced ability or tolerance with driving and/or computer work   []Reduced ability to sleep   [x]Reduced ability to perform lifting, reaching, carrying tasks   [x]Reduced ability to tolerate impact through UE   [x]Reduced ability to reach behind back   []Reduced ability to  or hold objects   [x]Reduced ability to throw or toss an object   []other:    Participation Restrictions   [x]Reduced participation in self care activities   [x]Reduced participation in home management activities   [x]Reduced participation in work activities   [x]Reduced participation in social activities. []Reduced participation in sport/recreation activities. Classification:   []Signs/symptoms consistent with post-surgical status including decreased ROM, strength and function.   [x]Signs/symptoms consistent with joint sprain/strain   [x]Signs/symptoms consistent with shoulder impingement   [x]Signs/symptoms consistent with shoulder/elbow/wrist tendinopathy   []Signs/symptoms consistent with Rotator cuff tear   []Signs/symptoms consistent with labral tear   []Signs/symptoms consistent with postural dysfunction    []Signs/symptoms consistent with Glenohumeral IR Deficit - <45 degrees   []Signs/symptoms consistent with facet dysfunction of cervical/thoracic spine    []Signs/symptoms consistent with pathology which may benefit from Dry needling     []other:     Prognosis/Rehab Potential:      []Excellent   [x]Good    []Fair   []Poor    Tolerance of evaluation/treatment:    []Excellent   [x]Good    []Fair   []Poor  Physical Therapy Evaluation Complexity Justification  [x] A history of present problem with:  [] no personal factors and/or comorbidities that impact the plan of care;  [x]1-2 personal factors and/or comorbidities that impact the plan of care  []3 personal factors and/or comorbidities that impact the plan of care  [x] An examination of body systems using standardized tests and measures addressing any of the following: body structures and functions (impairments), activity limitations, and/or participation restrictions;:  [x] a total of 1-2 or more elements   [] a total of 3 or more elements   [] a total of 4 or more elements   [x] A clinical presentation with:  [x] stable and/or uncomplicated characteristics   [] evolving clinical presentation with changing characteristics  [] unstable and unpredictable characteristics;   [x] Clinical decision making of [x] low, [] moderate, [] high complexity using standardized patient assessment instrument and/or measurable assessment of functional outcome. [x] EVAL (LOW) 90629 (typically 20 minutes face-to-face)  [] EVAL (MOD) 24666 (typically 30 minutes face-to-face)  [] EVAL (HIGH) 86249 (typically 45 minutes face-to-face)  [] RE-EVAL       PLAN:  Frequency/Duration:  2 days per week for 4 Weeks:  INTERVENTIONS:  [x] Therapeutic exercise including: strength training, ROM, for Upper extremity and core   [x]  NMR activation and proprioception for UE, scap and Core   [x] Manual therapy as indicated for shoulder, scapula and spine to include: Dry Needling/IASTM, STM, PROM, Gr I-IV mobilizations, manipulation. [x] Modalities as needed that may include: thermal agents, E-stim, Biofeedback, US, iontophoresis as indicated  [x] Patient education on joint protection, postural re-education, activity modification, progression of HEP. HEP instruction: [place] Nena Sizer: [id number](see scanned forms)    GOALS:  Patient stated goal: Patient will be able to raise her L arm to put her shirt on/off without pain. [] Progressing: [] Met: [] Not Met: [] Adjusted    Therapist goals for Patient:   Short Term Goals: To be achieved in: 2 weeks  1.  Independent in HEP and progression per patient tolerance, in order to prevent re-injury. [] Progressing: [] Met: [] Not Met: [] Adjusted  2. Patient will have a decrease in pain to facilitate improvement in movement, function, and ADLs as indicated by Functional Deficits. [] Progressing: [] Met: [] Not Met: [] Adjusted    Long Term Goals: To be achieved in: 4 weeks  1. Disability index score of 8% or less for the [facility] to assist with reaching prior level of function. [] Progressing: [] Met: [] Not Met: [] Adjusted  2. Patient will demonstrate increased AROM to 165 deg FE to allow for proper joint functioning as indicated by patients Functional Deficits. [] Progressing: [] Met: [] Not Met: [] Adjusted  3. Patient will demonstrate an increase in Strength to 4+/5 to allow for proper functional mobility as indicated by patients Functional Deficits. [] Progressing: [] Met: [] Not Met: [] Adjusted  4. Patient will return to 90% functional activities without increased symptoms or restriction. [] Progressing: [] Met: [] Not Met: [] Adjusted  5.  Patient will be able to golf without L shoulder pain. (patient specific functional goal)    [] Progressing: [] Met: [] Not Met: [] Adjusted       Electronically signed by:  Rosy Saez, PT, DPT, [facility] [id number]

## 2020-12-21 NOTE — PROGRESS NOTES
MD Anika Abreu, [place]         Orthopaedic Surgery and Sports Medicine    Encounter date: 12/16/2020  Patient Name: Elizabeth Player  YOB: 1955  Patient's PCP is Anton Granados MD    SUBJECTIVE  Chief Complaint:  Shoulder Pain (LEFT)      History of Present Illness:  Elizabeth Player is a right handed 72 y.o. female here regarding left shoulder pain. Referred by primary care physician Dr. Nancy Markham MD for evaluation consultation and treatment of left shoulder pain. The pain began in October 2020. There was not a history of injury. Location: diffusely throughout the shoulder  Quality: aching and sharp   Pain Scale: 8/10  Context: overall course is worsening   Alleviating Factors: rest  Exacerbating Factors: elevation, activity and repetitive activity  Associated Symptoms: none    Sleep pattern is affected by the chief complaint: Yes  The patient has not had PT. The patient has not had an injection. The patient has taken NSAIDs. Currently taking meloxicam 15 mg/day with some limited relief     The patient is not working. Pain Assessment:       Review of Systems:  Kumar Pena's review of systems has been performed by intake and observation. All past and current ROS forms have been scanned into the medical record. She has been instructed to contact her primary care provider regarding ROS issues if not already being addressed at this time. There are no recent changes. The most recent ROS was scanned into media on 12/16/2020    Past Medical History:  (see most recent intake form scanned into media on above date)  No past medical history on file. Past Surgical History:  (see most recent intake form scanned into media on above date)  No past surgical history on file.     Allergies:  (see most recent intake form scanned into media on above date)  No Known Allergies    Medications:  (see most recent intake form scanned into media on above date)  Current Outpatient Medications   Medication Sig Dispense Refill    meloxicam (MOBIC) 15 MG tablet Take 1 tablet by mouth daily 30 tablet 1    meloxicam (MOBIC) 15 MG tablet Take 1 tablet by mouth daily 90 tablet 3    meloxicam (MOBIC) 15 MG tablet Take 1 tablet by mouth daily 90 tablet 3    methylPREDNISolone (MEDROL, ALEXANDRA,) 4 MG tablet BURST THERAPY 1 kit 0    atorvastatin (LIPITOR) 10 MG tablet Take 10 mg by mouth daily       No current facility-administered medications for this visit. Coagulation:  On a blood thinner: No  History of a bleeding disorder: No  History of a previous blood clot: No    Goal for treatment: Improve function and decrease pain    OBJECTIVE  PHYSICAL EXAM  Vital Signs: There were no vitals filed for this visit. Body mass index is 22.34 kg/m². General Appearance: Patient is adequately groomed with no evidence of malnutrition   Orientation: Patient is alert and oriented to person, place and time  Mood and Affect: Neutral/Euthymic(normal)  Gait and Station: normal      Left shoulder Examination  Inspection:    Visual deformity noted: No    No swelling noted. No erythema or ecchymosis. Palpation: moderate tenderness to palpation on the anterior, lateral region. Range of Motion:  limited by pain, she can elevate her arm over her head but it is painful and she is slow to be able to elevate the arm secondary to discomfort she does appear to be limited with maximum internal and external rotation.     Stability and Special Testing:  Obriens test: positive              Apprehension test: negative              Load and Shift test: negative                                    Impingement test: positive                         Sulcus sign: negative              Bear Hug test: negative            Lift-Off test: not tested                   Cuff Drop Arm test: negative    Strength: Forward flexion: 4+/5        Abduction: 4/5        Internal Rotation: 5/5        External Rotation: 5/5    Neurologic: Sensation is intact to light touch throughout the median, ulnar and radial nerve distribution. Vascular: The bilateral upper extremities are warm and well-perfused with brisk capillary refill. Lymphatic: The lymphatic examination bilaterally reveals all areas to be without enlargement or induration    Skin: intact with no cellulitis, rashes, ulcerations, lymphedema or cutaneous lesions noted. Additional Examinations:  Right Upper Extremity:  Examination of the right upper extremity does not show any tenderness, deformity or injury. Range of motion is unremarkable. There is no gross instability. There are no rashes, ulcerations or lesions. Strength and tone are normal.  Neck: Examination of the neck does not show any tenderness, deformity or injury. Range of motion is within normal limits. There is no gross instability. There are no rashes, ulcerations or lesions. Strength and tone are normal.      DIAGNOSTICS:  Xrays obtained in office today: Yes  Xrays reviewed today: Yes  Four views of the left shoulder show   Fracture: No  Dislocation: No  Acromion morphology: Type II   Acromioclavicular joint arthritis: moderate  Glenohumeral joint arthritis: mild  Humeral head superior migration: mild  She has good joint space in the glenohumeral joint. I think she has some mild overall arthritic changes of the shoulder but certainly not significant. ASSESSMENT (Medical Decision Making)    Rush Dolan is a 72 y.o. female with the following diagnosis: Left shoulder pain with some rotator cuff symptoms although I think the rotator cuff is intact. Is likely representing strain of the rotator cuff possible rotator cuff tendinitis but unlikely rotator cuff tear      ICD-10-CM    1.  Left shoulder pain, unspecified chronicity  M25.512 XR SHOULDER LEFT (MIN 2 VIEWS)     [facility] PT - [facility]           PLAN (Medical Decision Making)  Office Procedures:  Orders Placed This Encounter   Procedures    XR SHOULDER LEFT (MIN 2 VIEWS)   Marce Gilliland PT [facility]     Referral Priority:   Routine     Referral Type:   Eval and Treat     Referral Reason:   Specialty Services Required     Requested Specialty:   Physical Therapy     Number of Visits Requested:   1       Treatment Plan:    I discussed the diagnosis and treatment options with Latosha Hoff today. I spent at least 60 minutes face to face with this patient today. Greater than 50% of that time was spent counseling, coordinating care, discussing and answering questions regarding the risks, benefits, and complications of left shoulder pain in detail. We discussed precautionary measures to prevent further injury, additional treatment options, including continuing her meloxicam.  I also spoke with her about starting outpatient physical therapy which I think could be of significant benefit. She has agreed to proceed with physical therapy. That will begin here at the [facility] location. Patient was asked to follow-up in 4 to 5 weeks and we can re-evaluate her again at that time. Non-steroidal anti-inflammatories medications (NSAIDs) can be used to assist with pain control and to reduce inflammatory changes. These medications may be over-the-counter or prescribed. We discussed taking the NSAID properly and the precautions. The patient understands that this medication may potentially interfere with other medications. Patient was also instructed to immediately discontinue the medication is there is any possible complication. Latosha Hoff was instructed to call the office if her symptoms worsen or if new symptoms appear prior to the next scheduled visit.  She is specifically instructed to contact the office between now and schedule appointment if she has concerns related to her condition or if she needs assistance in scheduling any above tests. She is welcome to call for an appointment sooner if she has any additional concerns or questions. This dictation was performed with a verbal recognition program (DRAGON) and it was checked for errors. It is possible that there are still dictated errors within this office note. If so, please bring any errors to my attention for an addendum. All efforts were made to ensure that this office note is accurate.

## 2021-07-19 ENCOUNTER — HOSPITAL ENCOUNTER (OUTPATIENT)
Dept: WOMENS IMAGING | Age: 66
Discharge: HOME | End: 2021-07-19
Payer: COMMERCIAL

## 2021-07-19 VITALS — HEIGHT: 66 IN | BODY MASS INDEX: 22.5 KG/M2 | WEIGHT: 140 LBS

## 2021-07-19 DIAGNOSIS — Z12.31 ENCOUNTER FOR SCREENING MAMMOGRAM FOR BREAST CANCER: ICD-10-CM

## 2021-07-19 PROCEDURE — 77066 DX MAMMO INCL CAD BI: CPT

## 2022-07-27 ENCOUNTER — HOSPITAL ENCOUNTER (OUTPATIENT)
Dept: WOMENS IMAGING | Age: 67
Discharge: HOME | End: 2022-07-27
Payer: COMMERCIAL

## 2022-07-27 DIAGNOSIS — M85.89 OSTEOPENIA OF MULTIPLE SITES: ICD-10-CM

## 2022-07-27 DIAGNOSIS — Z12.31 ENCOUNTER FOR SCREENING MAMMOGRAM FOR BREAST CANCER: ICD-10-CM

## 2022-07-27 PROCEDURE — 77080 DXA BONE DENSITY AXIAL: CPT

## 2022-07-27 PROCEDURE — 77063 BREAST TOMOSYNTHESIS BI: CPT

## 2023-09-22 ENCOUNTER — HOSPITAL ENCOUNTER (OUTPATIENT)
Dept: WOMENS IMAGING | Age: 68
Discharge: HOME | End: 2023-09-22
Payer: MEDICARE

## 2023-09-22 VITALS — WEIGHT: 135 LBS | BODY MASS INDEX: 21.69 KG/M2 | HEIGHT: 66 IN

## 2023-09-22 DIAGNOSIS — Z12.31 ENCOUNTER FOR SCREENING MAMMOGRAM FOR MALIGNANT NEOPLASM OF BREAST: ICD-10-CM

## 2023-09-22 PROCEDURE — 77063 BREAST TOMOSYNTHESIS BI: CPT

## 2024-09-23 ENCOUNTER — HOSPITAL ENCOUNTER (OUTPATIENT)
Dept: WOMENS IMAGING | Age: 69
Discharge: HOME | End: 2024-09-23
Payer: MEDICARE

## 2024-09-23 VITALS — WEIGHT: 135 LBS | HEIGHT: 66 IN | BODY MASS INDEX: 21.69 KG/M2

## 2024-09-23 DIAGNOSIS — Z12.31 ENCOUNTER FOR SCREENING MAMMOGRAM FOR MALIGNANT NEOPLASM OF BREAST: ICD-10-CM

## 2024-09-23 PROCEDURE — 77063 BREAST TOMOSYNTHESIS BI: CPT

## 2025-08-29 ENCOUNTER — TRANSCRIBE ORDERS (OUTPATIENT)
Dept: ADMINISTRATIVE | Age: 70
End: 2025-08-29

## 2025-08-29 DIAGNOSIS — M85.89 OSTEOPENIA OF MULTIPLE SITES: Primary | ICD-10-CM
